# Patient Record
(demographics unavailable — no encounter records)

---

## 2024-12-03 NOTE — PHYSICAL EXAM

## 2024-12-03 NOTE — HISTORY OF PRESENT ILLNESS
[FreeTextEntry1] : Annual physical [de-identified] : 70 y/o F with HTN, paroxysmal A.Fib on VCartia and chlortalidone, Hypothyroidism, on levothyroxine, Dyslipidemia on atorvastatin. Pt presents today for CPE. Feels good overall. Reports aches and pain in lower back Cardiology: Dr Emery

## 2024-12-03 NOTE — HEALTH RISK ASSESSMENT
[Good] : ~his/her~  mood as  good [No] : In the past 12 months have you used drugs other than those required for medical reasons? No [No falls in past year] : Patient reported no falls in the past year [0] : 2) Feeling down, depressed, or hopeless: Not at all (0) [PHQ-2 Negative - No further assessment needed] : PHQ-2 Negative - No further assessment needed [Never] : Never [Patient reported mammogram was normal] : Patient reported mammogram was normal [Patient reported PAP Smear was normal] : Patient reported PAP Smear was normal [Patient reported bone density results were abnormal] : Patient reported bone density results were abnormal [Patient reported colonoscopy was normal] : Patient reported colonoscopy was normal [HIV test declined] : HIV test declined [Hepatitis C test declined] : Hepatitis C test declined [None] : None [With Family] : lives with family [Retired] : retired [Single] : single [] :  [# Of Children ___] : has [unfilled] children [Feels Safe at Home] : Feels safe at home [Fully functional (bathing, dressing, toileting, transferring, walking, feeding)] : Fully functional (bathing, dressing, toileting, transferring, walking, feeding) [Fully functional (using the telephone, shopping, preparing meals, housekeeping, doing laundry, using] : Fully functional and needs no help or supervision to perform IADLs (using the telephone, shopping, preparing meals, housekeeping, doing laundry, using transportation, managing medications and managing finances) [Smoke Detector] : smoke detector [Seat Belt] :  uses seat belt [With Patient/Caregiver] : , with patient/caregiver [Designated Healthcare Proxy] : Designated healthcare proxy [Relationship: ___] : Relationship: [unfilled] [de-identified] : cardiology [de-identified] : walks [[id number]] : 0 [Change in mental status noted] : No change in mental status noted [Language] : denies difficulty with language [Handling Complex Tasks] : denies difficulty handling complex tasks [Sexually Active] : not sexually active [Reports changes in hearing] : Reports no changes in hearing [Reports changes in vision] : Reports no changes in vision [Reports changes in dental health] : Reports no changes in dental health [MammogramDate] : 08/23 [PapSmearDate] : 2022 [BoneDensityDate] : 08/23 [BoneDensityComments] : osteopenia [ColonoscopyDate] : 2015 [ColonoscopyComments] : GI: Dr Jackelin Corrigan [AdvancecareDate] : 12/24

## 2024-12-03 NOTE — ASSESSMENT
[FreeTextEntry1] : CPE: blood work done in the office. -HC and HIV: refused -Colonoscopy: 2015> f/up with Dr Beauchamp. -Mammo: 08/2023>Referral. -Gyn: 04/2022 -DEXA: 08/2023> osteopenia> on Calcium and Vit D  # -HTN:Paroxismal A.Fib: -F/up with cardiology -Continue CARTIA, Chlorthalidone.  -Immunizations: Zostavax : 2017> Advise Shingrix at Pharmacy Prevnar 2019 Prevnar 20  COVID vaccine 3 doses> COVID Bivalent today Refused flu shot  -Ophthalmology: Annual screening.

## 2025-01-28 NOTE — REASON FOR VISIT
[Follow-Up - Clinic] : a clinic follow-up of [FreeTextEntry1] :  HTN, paroxysmal afib.  [FreeTextEntry2] :  HTN, paroxysmal afib.

## 2025-01-28 NOTE — END OF VISIT
[Time Spent: ___ minutes] : I have spent [unfilled] minutes of time on the encounter which excludes teaching and separately reported services. [>50% of Time Spent on Counseling and Coordination of Care for  ___] : Greater than 50% of the encounter time was spent on counseling and coordination of care for [unfilled]

## 2025-01-28 NOTE — DISCUSSION/SUMMARY
[Patient] : the patient [Risks] : risks [Benefits] : benefits [Alternatives] : alternatives [With Me] : with me [EKG obtained to assist in diagnosis and management of assessed problem(s)] : EKG obtained to assist in diagnosis and management of assessed problem(s) [___ Year(s)] : in [unfilled] year(s) [FreeTextEntry1] : This is a 72 y/o F with history of HTN, Paroxysmal afib, chronic blood loss anemia, episodes of afib during severe anemia, rectal prolapse and hemorrhoids s/p surgery, here for follow up routinely.  1) Pre-operative cardiovascular risk evaluation and management :  colonoscopy.  stress echo transthoracic echocardiogram  if no significnat ischemia, then proceed for stress test .  2 ) Paroxysmal afib/palpitations:  Continue cardizem. No AC. 1st degree AV block.   not on  anticoagulation  2) HTN: controlled.  ct  cardizem 180 mg rod  3) CAD evaluation: CAC= 31. On lipitor 10 mg daily.  diet and [id number].  cut down on fatty . repeat CT caclium score   4) claudication:  resolved.

## 2025-01-28 NOTE — CARDIOLOGY SUMMARY
[___] : [unfilled] [LVEF ___%] : LVEF [unfilled]% [Enlarged] : enlarged LA size [None] : no mitral regurgitation [de-identified] : 1 28 2025:  Sinus Rhythm -First degree A-V block  Bárbara = 242 BORDERLINE RHYTHM 2 7 2023  sinus . fuirst degee. nonspecific ST T changes  2 15 2022  Sinus  Rhythm  -First degree A-V block  non specific T wave changes      8/11/2021  Sinus  Rhythm  -First degree A-V block  Bárbara = 234    [de-identified] : feb 2023:  No afib episodes.  sept 2021:  1 mth.  no atrial fibrillation  [de-identified] : nov 2021:  .normal LVEF. normal RV. no significant valvular abnormality  [de-identified] : dec 2024:  LDL : 118   HDL: 75.  total: 2203.  Tgs: 60  [de-identified] : aug 2017 [FreeTextEntry2] : aug 2017 LVEf 65-70%. ; no significant valvular abnormality  [de-identified] : aug 2-017 [de-identified] : 30 day event monitor. no afib.

## 2025-01-28 NOTE — PHYSICAL EXAM
[General Appearance - Well Developed] : well developed [Normal Appearance] : normal appearance [Well Groomed] : well groomed [General Appearance - Well Nourished] : well nourished [No Deformities] : no deformities [General Appearance - In No Acute Distress] : no acute distress [Normal Conjunctiva] : the conjunctiva exhibited no abnormalities [Eyelids - No Xanthelasma] : the eyelids demonstrated no xanthelasmas [Normal Oral Mucosa] : normal oral mucosa [No Oral Pallor] : no oral pallor [No Oral Cyanosis] : no oral cyanosis [Normal Jugular Venous A Waves Present] : normal jugular venous A waves present [Normal Jugular Venous V Waves Present] : normal jugular venous V waves present [No Jugular Venous Crouch A Waves] : no jugular venous crouch A waves [Respiration, Rhythm And Depth] : normal respiratory rhythm and effort [Exaggerated Use Of Accessory Muscles For Inspiration] : no accessory muscle use [Auscultation Breath Sounds / Voice Sounds] : lungs were clear to auscultation bilaterally [Heart Rate And Rhythm] : heart rate and rhythm were normal [Heart Sounds] : normal S1 and S2 [Abdomen Soft] : soft [Abdomen Tenderness] : non-tender [Abdomen Mass (___ Cm)] : no abdominal mass palpated [Abnormal Walk] : normal gait [Gait - Sufficient For Exercise Testing] : the gait was sufficient for exercise testing [Nail Clubbing] : no clubbing of the fingernails [Cyanosis, Localized] : no localized cyanosis [Petechial Hemorrhages (___cm)] : no petechial hemorrhages [Skin Color & Pigmentation] : normal skin color and pigmentation [] : no rash [No Venous Stasis] : no venous stasis [Skin Lesions] : no skin lesions [No Skin Ulcers] : no skin ulcer [No Xanthoma] : no  xanthoma was observed [Oriented To Time, Place, And Person] : oriented to person, place, and time [Affect] : the affect was normal [Mood] : the mood was normal [No Anxiety] : not feeling anxious [FreeTextEntry1] : trivial ankle edema,

## 2025-01-28 NOTE — HISTORY OF PRESENT ILLNESS
[FreeTextEntry1] : F/u for paroxysmal afib, HTN   HPI for today:      1 28 2025:  She is going for colonoscopy.  Pre-operative cardiovascular risk evaluation and management .  no chest pain . no dyspnea on exertion . no .dizziness.  no palptiations. no syncope   colonscopy. is mar 14.  2025:   ol dnote:  no chest pain . no dyspnea on exertion ,. no dizziness. m ambulates.  physically active. 20K steps everyday no palptaitons. no dizziness. no palpitaitons   old note: feels good. has low potassium. no syncope. no chest pain . no syncope.   ol dnote: : feels ok.  complains of coughing. she states it comes and goes.  no new symptoms.  no bleeding. no palpitaitons. complatn with med.s   Old note: feels better. No chest pain. No shortness of breathe.   NO melena.  No chest pain. No SOB. No QUEVEDO. no rectal bleeding.  no palpitation.s

## 2025-03-10 NOTE — HISTORY OF PRESENT ILLNESS
[FreeTextEntry8] : 71-year-old patient Olga presents with chest discomfort which she likens to having a 'chest cold'. Patient reports having coughing fits and a sensation of a 'knot' in her chest after eating or drinking. She also reports feeling 'breathy' since her recent stress test.  The patient has been experiencing symptoms of chest discomfort and coughing, which she attributes to a recent stress test. The patient reports these symptoms have been occurring for about five days. No other symptoms such as fever, congestion, or extreme fatigue were reported, but the patient does mention occasional headaches.  Patient mentions high blood pressure, for which medication has been increased. Recently, she underwent a stress test (3/6/25) which yielded abnormal results, leading to a decision to do a CAT scan to check for any blockages. Patient also mentions having worn a heart monitor which also showed unusual results.

## 2025-03-10 NOTE — ASSESSMENT
[FreeTextEntry1] : The patient's primary issues are cough and chest discomfort. These symptoms began following a stress test. 0n 3/5/25. Findings from the physical examination suggest that the cough is likely derived from the upper airway.   # Cough/ Chest Discomfort> Acute Bronchitis  Provide a five-day Z-pack to clear the wet cough - Prescribe medication for the cough  #  Skin Lesion in anterior chest: - Dermatology referral  # Abnormal STress tests; - F/up with Cardiology and follow up on the results of the CAT scan for possible blockages.

## 2025-03-10 NOTE — PHYSICAL EXAM
[Normal Rate] : the respiratory rate was normal [Wet Cough] : a wet cough was heard [Clear Bilaterally] : the lungs were clear to auscultation bilaterally [Normal] : normal rate, regular rhythm, normal S1 and S2 and no murmur heard

## 2025-06-02 NOTE — ASSESSMENT
[FreeTextEntry1] : HCM -CPE 12/2024 -HC and HIV: refused -Colonoscopy: 2015 up to date. recently done w/ Dr Beauchamp.>Colonoscopy on hold? pending. COlaguard order -Mammo: 12/2024 -Gyn: 04/2022 -DEXA: 08/2023> osteopenia> on Calcium and Vit D  # -HTN:Paroxismal A.Fib: -F/up with cardiology -Continue CARTIA, Chlorthalidone.  # Hypothyroidism: -On levothyroxine  -Immunizations: Zostavax : 2017> Advise Shingrix at Pharmacy Prevnar 2019 Prevnar 20 COVID vaccine 3 doses> COVID Bivalent today Refused flu shot  -Ophthalmology: Annual screening.

## 2025-06-02 NOTE — HISTORY OF PRESENT ILLNESS
[FreeTextEntry1] : F/up [de-identified] : 72 y/o F with HTN, paroxysmal A.Fib on VCartia and chlortalidone, Hypothyroidism, on levothyroxine, Dyslipidemia on atorvastatin. Feels good overall. Cardiology: Dr Emery